# Patient Record
Sex: MALE | Race: WHITE
[De-identification: names, ages, dates, MRNs, and addresses within clinical notes are randomized per-mention and may not be internally consistent; named-entity substitution may affect disease eponyms.]

---

## 2017-09-29 ENCOUNTER — HOSPITAL ENCOUNTER (OUTPATIENT)
Dept: HOSPITAL 84 - D.OPS | Age: 47
Discharge: HOME | End: 2017-09-29
Attending: INTERNAL MEDICINE
Payer: MEDICARE

## 2017-09-29 VITALS — DIASTOLIC BLOOD PRESSURE: 92 MMHG | SYSTOLIC BLOOD PRESSURE: 146 MMHG

## 2017-09-29 VITALS — BODY MASS INDEX: 32.2 KG/M2

## 2017-09-29 DIAGNOSIS — K74.60: Primary | ICD-10-CM

## 2017-09-29 DIAGNOSIS — Z01.812: ICD-10-CM

## 2017-09-29 LAB
ANION GAP SERPL CALC-SCNC: 10.5 MMOL/L (ref 8–16)
APTT BLD: 32.6 SECONDS (ref 22.8–39.4)
BASOPHILS NFR BLD AUTO: 0.8 % (ref 0–2)
BUN SERPL-MCNC: 13 MG/DL (ref 7–18)
CALCIUM SERPL-MCNC: 8.9 MG/DL (ref 8.5–10.1)
CHLORIDE SERPL-SCNC: 104 MMOL/L (ref 98–107)
CO2 SERPL-SCNC: 28 MMOL/L (ref 21–32)
CREAT SERPL-MCNC: 1.2 MG/DL (ref 0.6–1.3)
EOSINOPHIL NFR BLD: 4.5 % (ref 0–7)
ERYTHROCYTE [DISTWIDTH] IN BLOOD BY AUTOMATED COUNT: 12.9 % (ref 11.5–14.5)
GLUCOSE SERPL-MCNC: 182 MG/DL (ref 74–106)
HCT VFR BLD CALC: 45.5 % (ref 42–54)
HGB BLD-MCNC: 15.8 G/DL (ref 13.5–17.5)
IMM GRANULOCYTES NFR BLD: 0.2 % (ref 0–5)
INR PPP: 1.01 (ref 0.85–1.17)
LYMPHOCYTES NFR BLD AUTO: 28.4 % (ref 15–50)
MCH RBC QN AUTO: 31.5 PG (ref 26–34)
MCHC RBC AUTO-ENTMCNC: 34.7 G/DL (ref 31–37)
MCV RBC: 90.6 FL (ref 80–100)
MONOCYTES NFR BLD: 15.4 % (ref 2–11)
NEUTROPHILS NFR BLD AUTO: 50.7 % (ref 40–80)
OSMOLALITY SERPL CALC.SUM OF ELEC: 282 MOSM/KG (ref 275–300)
PLATELET # BLD: 115 10X3/UL (ref 130–400)
PMV BLD AUTO: 11.1 FL (ref 7.4–10.4)
POTASSIUM SERPL-SCNC: 3.5 MMOL/L (ref 3.5–5.1)
PROTHROMBIN TIME: 13.2 SECONDS (ref 11.6–15)
RBC # BLD AUTO: 5.02 10X6/UL (ref 4.2–6.1)
SODIUM SERPL-SCNC: 139 MMOL/L (ref 136–145)
WBC # BLD AUTO: 4.9 10X3/UL (ref 4.8–10.8)

## 2017-09-29 NOTE — NUR
1300--IV DC'D, PT UP TO DRESS AT THIS TIME. DI ETIENNE
 
1315--DISCHARGE INSTRUCTIONS GIVEN, PT VERBALIZES UNDERSTANDING. PT
OFF UNIT VIA WC. DI ETIENNE

## 2017-09-29 NOTE — NUR
1033--PT COMPLAINS OF PAIN RATES PAIN 5-6/10, NORCO 10/325MG GIVEN PO
FOR PAIN. WILL CONTINUE TO MONITOR. DI ETIENNE

## 2018-01-15 ENCOUNTER — HOSPITAL ENCOUNTER (OUTPATIENT)
Dept: HOSPITAL 84 - D.US | Age: 48
Discharge: HOME | End: 2018-01-15
Attending: INTERNAL MEDICINE
Payer: MEDICARE

## 2018-01-15 VITALS — BODY MASS INDEX: 32.2 KG/M2

## 2018-01-15 DIAGNOSIS — R74.8: Primary | ICD-10-CM

## 2018-01-15 LAB
ALBUMIN SERPL-MCNC: 3.7 G/DL (ref 3.4–5)
ALP SERPL-CCNC: 155 U/L (ref 46–116)
ALT SERPL-CCNC: 73 U/L (ref 10–68)
BILIRUB DIRECT SERPL-MCNC: 0.28 MG/DL (ref 0–0.3)
BILIRUB INDIRECT SERPL-MCNC: 0.42 MG/DL (ref 0–1)
BILIRUB SERPL-MCNC: 0.7 MG/DL (ref 0.2–1.3)
GLOBULIN SER-MCNC: 3.9 G/L
PROT SERPL-MCNC: 7.6 G/DL (ref 6.4–8.2)

## 2018-07-16 ENCOUNTER — HOSPITAL ENCOUNTER (OUTPATIENT)
Dept: HOSPITAL 84 - D.US | Age: 48
Discharge: HOME | End: 2018-07-16
Attending: INTERNAL MEDICINE
Payer: MEDICARE

## 2018-07-16 VITALS — BODY MASS INDEX: 32.2 KG/M2

## 2018-07-16 DIAGNOSIS — K76.0: Primary | ICD-10-CM

## 2018-07-16 DIAGNOSIS — R79.89: ICD-10-CM

## 2018-07-16 LAB
ALBUMIN SERPL-MCNC: 3.8 G/DL (ref 3.4–5)
ALP SERPL-CCNC: 159 U/L (ref 46–116)
ALT SERPL-CCNC: 110 U/L (ref 10–68)
BILIRUB DIRECT SERPL-MCNC: 0.44 MG/DL (ref 0–0.3)
BILIRUB INDIRECT SERPL-MCNC: 0.68 MG/DL (ref 0–1)
BILIRUB SERPL-MCNC: 1.12 MG/DL (ref 0.2–1.3)
GLOBULIN SER-MCNC: 4.1 G/L
PROT SERPL-MCNC: 7.9 G/DL (ref 6.4–8.2)